# Patient Record
(demographics unavailable — no encounter records)

---

## 2018-02-16 NOTE — PD
HPI


Chief Complaint:   Complaint


Time Seen by Provider:  07:54


Travel History


International Travel<30 days:  No


Contact w/Intl Traveler<30days:  No


Traveled to known affect area:  No





History of Present Illness


HPI


The patient is a 16-year-old female who presents to the emergency department 

for dysuria, frequency, urgency, suprapubic discomfort, bilateral flank pain of 

2 days' duration.  The patient does complain of dysuria associated with 

frequency and urgency.  She denies any associated vaginal discharge or vaginal 

bleeding.  The patient's last menstrual cycle was February 2, 2018.  The 

patient is sexually active for one year, states she may be pregnant.  She 

denies any history of recurrent UTI or pyelonephritis.  She does complain of 

nausea without any vomiting or diarrhea.  She denies any assisted fever, chills

, or sweats.





PFSH


Past Medical History


Medical History:  Denies Significant Hx


Immunizations Current:  Yes


Tetanus Vaccination:  < 5 Years


Influenza Vaccination:  No


Pregnant?:  Unknown


LMP:  02/02/18





Past Surgical History


Surgical History:  No Previous Surgery





Social History


*** Narrative Social History


Currently in the 10th grade


Alcohol Use:  No


Tobacco Use:  No


Substance Use:  No





Allergies-Medications


(Allergen,Severity, Reaction):  


Coded Allergies:  


     No Known Allergies (Unverified , 2/16/18)


Reported Meds & Prescriptions





Reported Meds & Active Scripts


Active


No Active Prescriptions or Reported Medications    








Review of Systems


Except as stated in HPI:  all other systems reviewed are Neg


General / Constitutional:  No: Fever


Gastrointestinal:  Positive: Nausea, No: Vomiting, Abdominal Pain


Genitourinary:  Positive: Urgency, Frequency, Dysuria, Flank Pain, No: Hematuria

, Discharge, Vaginal Bleeding


Skin:  No Rash





Physical Exam


Narrative


GENERAL: Awake, alert, pleasant 16-year-old female who appears her stated age 

and is in no acute respiratory distress.  The exam was performed in the 

presence of a female nurse.


SKIN: Focused skin assessment warm/dry.


HEAD: Atraumatic. Normocephalic. 


EYES: No injection or drainage.


GASTROINTESTINAL: Abdomen soft, minimal suprapubic and left lower quadrant 

tenderness.  Negative Gustafson's.  Negative McBurney's.


Back: Mild bilateral CVA tenderness.


MUSCULOSKELETAL: No obvious deformities. No clubbing.  No cyanosis.  No edema. 


NEUROLOGICAL: Awake and alert. No obvious cranial nerve deficits.  Motor 

grossly within normal limits. Normal speech.


PSYCHIATRIC: Appropriate mood and affect; insight and judgment normal.





Data


Data


Last Documented VS





Vital Signs








  Date Time  Temp Pulse Resp B/P (MAP) Pulse Ox O2 Delivery O2 Flow Rate FiO2


 


2/16/18 07:55 97.8 89 16 114/63 (80) 100 Room Air  








Orders





 Orders


Urinalysis - C+S If Indicated (2/16/18 08:07)


Ed Urine Pregnancytest Poc (2/16/18 08:07)


Urine Culture (2/16/18 08:10)


Ed Discharge Order (2/16/18 08:49)





Labs





Laboratory Tests








Test


  2/16/18


08:10


 


Urine Color YELLOW 


 


Urine Turbidity CLOUDY 


 


Urine pH 6.5 


 


Urine Specific Gravity 1.017 


 


Urine Protein 30 mg/dL 


 


Urine Glucose (UA) NEG mg/dL 


 


Urine Ketones NEG mg/dL 


 


Urine Occult Blood SMALL 


 


Urine Nitrite NEG 


 


Urine Bilirubin NEG 


 


Urine Urobilinogen


  LESS THAN 2.0


MG/DL


 


Urine Leukocyte Esterase LARGE 


 


Urine RBC 11 /hpf 


 


Urine WBC  /hpf 


 


Urine WBC Clumps FEW 


 


Urine Squamous Epithelial


Cells 7 /hpf 


 


 


Urine Bacteria MOD /hpf 


 


Urine Mucus FEW /lpf 


 


Microscopic Urinalysis Comment


  CULTURE


INDICATED











MDM


Medical Decision Making


Medical Screen Exam Complete:  Yes


Emergency Medical Condition:  Yes


Medical Record Reviewed:  Yes


Interpretation(s)





Laboratory Tests








Test


  2/16/18


08:10


 


Urine Color YELLOW 


 


Urine Turbidity CLOUDY 


 


Urine pH 6.5 


 


Urine Specific Gravity 1.017 


 


Urine Protein 30 mg/dL 


 


Urine Glucose (UA) NEG mg/dL 


 


Urine Ketones NEG mg/dL 


 


Urine Occult Blood SMALL 


 


Urine Nitrite NEG 


 


Urine Bilirubin NEG 


 


Urine Urobilinogen


  LESS THAN 2.0


MG/DL


 


Urine Leukocyte Esterase LARGE 


 


Urine RBC 11 /hpf 


 


Urine WBC  /hpf 


 


Urine WBC Clumps FEW 


 


Urine Squamous Epithelial


Cells 7 /hpf 


 


 


Urine Bacteria MOD /hpf 


 


Urine Mucus FEW /lpf 


 


Microscopic Urinalysis Comment


  CULTURE


INDICATED








Differential Diagnosis


Differential diagnosis includes UTI, pyelonephritis, vaginitis, cervicitis, 

pregnancy, ovarian cyst, nephrolithiasis.


Narrative Course


A UA was sent to lab.  Bedside UA pregnancy test was obtained.  UA pregnancy 

test was negative.  UA is positive for infection.  The patient will be treated 

with Bactrim twice a day for 7 days and Pyridium for 2 days.  She is advised to 

follow-up with a primary physician.  Urine culture is pending.





Diagnosis





 Primary Impression:  


 UTI (urinary tract infection)


 Qualified Codes:  N30.00 - Acute cystitis without hematuria


Patient Instructions:  General Instructions





***Additional Instructions:  


Medications as directed.  Follow-up with your primary physician.  Return if 

symptoms worsen or progress.  Please provide a patient a copy of her UA results 

at discharge.


***Med/Other Pt SpecificInfo:  Prescription(s) given


Scripts


Phenazopyridine (Pyridium) 100 Mg Tab


100 MG PO Q8HR for Dysuria for 2 Days, TAB 0 Refills


   Prov: Corby Jama MD         2/16/18 


Sulfamethoxazole-Trimethoprim (Bactrim DS) 800-160 Mg Tab


1 TAB PO BID for Infection, #14 TAB 0 Refills


   Prov: Corby Jama MD         2/16/18


Disposition:  01 DISCHARGE HOME


Condition:  Stable











Croby Jama MD Feb 16, 2018 08:12